# Patient Record
Sex: FEMALE | Race: BLACK OR AFRICAN AMERICAN | NOT HISPANIC OR LATINO | Employment: UNEMPLOYED | URBAN - METROPOLITAN AREA
[De-identification: names, ages, dates, MRNs, and addresses within clinical notes are randomized per-mention and may not be internally consistent; named-entity substitution may affect disease eponyms.]

---

## 2023-04-17 ENCOUNTER — APPOINTMENT (OUTPATIENT)
Dept: RADIOLOGY | Facility: MEDICAL CENTER | Age: 43
End: 2023-04-17
Attending: EMERGENCY MEDICINE
Payer: COMMERCIAL

## 2023-04-17 ENCOUNTER — HOSPITAL ENCOUNTER (EMERGENCY)
Facility: MEDICAL CENTER | Age: 43
End: 2023-04-17
Attending: EMERGENCY MEDICINE
Payer: COMMERCIAL

## 2023-04-17 VITALS
SYSTOLIC BLOOD PRESSURE: 121 MMHG | TEMPERATURE: 98.2 F | RESPIRATION RATE: 16 BRPM | WEIGHT: 177.47 LBS | OXYGEN SATURATION: 95 % | HEART RATE: 75 BPM | DIASTOLIC BLOOD PRESSURE: 78 MMHG | BODY MASS INDEX: 26.29 KG/M2 | HEIGHT: 69 IN

## 2023-04-17 DIAGNOSIS — O20.0 THREATENED MISCARRIAGE: ICD-10-CM

## 2023-04-17 LAB
ALBUMIN SERPL BCP-MCNC: 4.5 G/DL (ref 3.2–4.9)
ALBUMIN/GLOB SERPL: 1.5 G/DL
ALP SERPL-CCNC: 61 U/L (ref 30–99)
ALT SERPL-CCNC: 28 U/L (ref 2–50)
ANION GAP SERPL CALC-SCNC: 13 MMOL/L (ref 7–16)
APPEARANCE UR: CLEAR
AST SERPL-CCNC: 23 U/L (ref 12–45)
B-HCG SERPL-ACNC: 146 MIU/ML (ref 0–5)
BACTERIA #/AREA URNS HPF: NEGATIVE /HPF
BASOPHILS # BLD AUTO: 0.5 % (ref 0–1.8)
BASOPHILS # BLD: 0.03 K/UL (ref 0–0.12)
BILIRUB SERPL-MCNC: <0.2 MG/DL (ref 0.1–1.5)
BILIRUB UR QL STRIP.AUTO: NEGATIVE
BUN SERPL-MCNC: 9 MG/DL (ref 8–22)
CALCIUM ALBUM COR SERPL-MCNC: 9.3 MG/DL (ref 8.5–10.5)
CALCIUM SERPL-MCNC: 9.7 MG/DL (ref 8.5–10.5)
CANDIDA DNA VAG QL PROBE+SIG AMP: NEGATIVE
CHLORIDE SERPL-SCNC: 104 MMOL/L (ref 96–112)
CO2 SERPL-SCNC: 22 MMOL/L (ref 20–33)
COLOR UR: YELLOW
CREAT SERPL-MCNC: 0.54 MG/DL (ref 0.5–1.4)
EOSINOPHIL # BLD AUTO: 0.12 K/UL (ref 0–0.51)
EOSINOPHIL NFR BLD: 1.9 % (ref 0–6.9)
EPI CELLS #/AREA URNS HPF: NEGATIVE /HPF
ERYTHROCYTE [DISTWIDTH] IN BLOOD BY AUTOMATED COUNT: 40.7 FL (ref 35.9–50)
G VAGINALIS DNA VAG QL PROBE+SIG AMP: NEGATIVE
GFR SERPLBLD CREATININE-BSD FMLA CKD-EPI: 119 ML/MIN/1.73 M 2
GLOBULIN SER CALC-MCNC: 3.1 G/DL (ref 1.9–3.5)
GLUCOSE SERPL-MCNC: 153 MG/DL (ref 65–99)
GLUCOSE UR STRIP.AUTO-MCNC: NEGATIVE MG/DL
HCT VFR BLD AUTO: 40 % (ref 37–47)
HGB BLD-MCNC: 13.1 G/DL (ref 12–16)
HYALINE CASTS #/AREA URNS LPF: NORMAL /LPF
IMM GRANULOCYTES # BLD AUTO: 0.01 K/UL (ref 0–0.11)
IMM GRANULOCYTES NFR BLD AUTO: 0.2 % (ref 0–0.9)
KETONES UR STRIP.AUTO-MCNC: NEGATIVE MG/DL
LEUKOCYTE ESTERASE UR QL STRIP.AUTO: ABNORMAL
LIPASE SERPL-CCNC: 36 U/L (ref 11–82)
LYMPHOCYTES # BLD AUTO: 3.32 K/UL (ref 1–4.8)
LYMPHOCYTES NFR BLD: 53.5 % (ref 22–41)
MCH RBC QN AUTO: 26.5 PG (ref 27–33)
MCHC RBC AUTO-ENTMCNC: 32.8 G/DL (ref 33.6–35)
MCV RBC AUTO: 81 FL (ref 81.4–97.8)
MICRO URNS: ABNORMAL
MONOCYTES # BLD AUTO: 0.25 K/UL (ref 0–0.85)
MONOCYTES NFR BLD AUTO: 4 % (ref 0–13.4)
NEUTROPHILS # BLD AUTO: 2.47 K/UL (ref 2–7.15)
NEUTROPHILS NFR BLD: 39.9 % (ref 44–72)
NITRITE UR QL STRIP.AUTO: NEGATIVE
NRBC # BLD AUTO: 0 K/UL
NRBC BLD-RTO: 0 /100 WBC
NUMBER OF RH DOSES IND 8505RD: NORMAL
PH UR STRIP.AUTO: 7 [PH] (ref 5–8)
PLATELET # BLD AUTO: 238 K/UL (ref 164–446)
PMV BLD AUTO: 10.6 FL (ref 9–12.9)
POTASSIUM SERPL-SCNC: 4.5 MMOL/L (ref 3.6–5.5)
PROT SERPL-MCNC: 7.6 G/DL (ref 6–8.2)
PROT UR QL STRIP: NEGATIVE MG/DL
RBC # BLD AUTO: 4.94 M/UL (ref 4.2–5.4)
RBC # URNS HPF: NORMAL /HPF
RBC UR QL AUTO: NEGATIVE
RH BLD: NORMAL
SODIUM SERPL-SCNC: 139 MMOL/L (ref 135–145)
SP GR UR STRIP.AUTO: 1.02
T VAGINALIS DNA VAG QL PROBE+SIG AMP: NEGATIVE
UROBILINOGEN UR STRIP.AUTO-MCNC: 0.2 MG/DL
WBC # BLD AUTO: 6.2 K/UL (ref 4.8–10.8)
WBC #/AREA URNS HPF: NORMAL /HPF

## 2023-04-17 PROCEDURE — 83690 ASSAY OF LIPASE: CPT

## 2023-04-17 PROCEDURE — 86901 BLOOD TYPING SEROLOGIC RH(D): CPT

## 2023-04-17 PROCEDURE — 87660 TRICHOMONAS VAGIN DIR PROBE: CPT

## 2023-04-17 PROCEDURE — 36415 COLL VENOUS BLD VENIPUNCTURE: CPT

## 2023-04-17 PROCEDURE — 87591 N.GONORRHOEAE DNA AMP PROB: CPT

## 2023-04-17 PROCEDURE — 80053 COMPREHEN METABOLIC PANEL: CPT

## 2023-04-17 PROCEDURE — 85025 COMPLETE CBC W/AUTO DIFF WBC: CPT

## 2023-04-17 PROCEDURE — 81001 URINALYSIS AUTO W/SCOPE: CPT

## 2023-04-17 PROCEDURE — 87480 CANDIDA DNA DIR PROBE: CPT

## 2023-04-17 PROCEDURE — 99284 EMERGENCY DEPT VISIT MOD MDM: CPT

## 2023-04-17 PROCEDURE — 84702 CHORIONIC GONADOTROPIN TEST: CPT

## 2023-04-17 PROCEDURE — 87510 GARDNER VAG DNA DIR PROBE: CPT

## 2023-04-17 PROCEDURE — 76801 OB US < 14 WKS SINGLE FETUS: CPT

## 2023-04-17 PROCEDURE — 87491 CHLMYD TRACH DNA AMP PROBE: CPT

## 2023-04-18 LAB
C TRACH DNA GENITAL QL NAA+PROBE: NEGATIVE
N GONORRHOEA DNA GENITAL QL NAA+PROBE: NEGATIVE
SPECIMEN SOURCE: NORMAL

## 2023-04-18 NOTE — ED PROVIDER NOTES
ER Provider Note    Scribed for Cara Newberry M.d. by Desmond Bryson. 4/17/2023  8:18 PM    Primary Care Provider: No primary care provider noted.    CHIEF COMPLAINT  Chief Complaint   Patient presents with    Vaginal Bleeding     Pt states she didn't have a menstrual cycle for the month of March. Pt then had a heavy cycle  this month. Pt went to see her MD and was complaining of lower abdominal pain and pt had a positive pregnancy test at MD's office. Pt sent here for US. Pt unsure of LMP.     EXTERNAL RECORDS REVIEWED  Other None    HPI/ROS  LIMITATION TO HISTORY   Select: Language Bahraini Creole,  Used   OUTSIDE HISTORIAN(S):  None    Genese Saint Paul Noel is a 42 y.o. female who presents to the ED complaining of vaginal bleeding onset earlier this month. She states that she did not have a menstural cycle for the month of March, but she has now been experiecing a heavy cycle this month.  However, bleeding started tapering off a few days ago, and today she is not bleeding.  She presented to [facility] today for the same complaint, and had a positive pregnancy test. She was sent here for an ultrasound. Currently, she denies any pain or vaginal bleeding. She last had some vaginal spotting/bleeding yesterday. She is unsure of the exact date of her last menstrual period but believes her last normal period was in February.  She describes passing some clots with her bleeding last week.  This is her second pregnancy.  History is obtained via iPad .  Patient speaks Creole    PAST MEDICAL HISTORY  Past Medical History:   Diagnosis Date    Diabetes (HCC)        SURGICAL HISTORY  History reviewed. No pertinent surgical history.    FAMILY HISTORY  History reviewed. No pertinent family history.    SOCIAL HISTORY   reports that she has never smoked. She has never used smokeless tobacco. She reports that she does not currently use alcohol. She reports that she does not currently use  "drugs.    CURRENT MEDICATIONS  Discharge Medication List as of 4/17/2023 10:00 PM        CONTINUE these medications which have NOT CHANGED    Details   metFORMIN (GLUCOPHAGE) 500 MG Tab Take 500 mg by mouth 2 times a day with meals., Historical Med             ALLERGIES  Patient has no known allergies.    PHYSICAL EXAM  /87   Pulse 88   Temp 36.1 °C (97 °F) (Temporal)   Resp 16   Ht 1.753 m (5' 9\")   Wt 80.5 kg (177 lb 7.5 oz)   SpO2 100%   BMI 26.21 kg/m²   Constitutional: Well developed, well nourished; No acute distress   HENT: Normocephalic, Atraumatic, Bilateral external ears normal, oropharyngeal examination deferred due to COVID-19 outbreak and lack of oropharyngeal complaint  Eyes: PERRL, EOMI, Conjunctiva normal, No discharge.   Neck: Normal range of motion, supple, nontender  Lymphatic: No lymphadenopathy noted.   Cardiovascular: Normal heart rate, Normal rhythm, No murmurs, rubs or gallops   Thorax & Lungs: CTA=bilaterally;  No respiratory distress, No wheezing rales, or rhonchi; No chest tenderness. No crepitus or subQ air  Abdomen: Nontender, no guarding no rebound, no masses, no pulsatile mass, no tenderness, no distention  Pelvic: External genitalia normal without lesions or vesicles.  There is no blood in the vaginal vault.  No discharge in the vault.  No cervical motion tenderness.  No midline suprapubic tenderness.  No adnexal tenderness or masses.  Skin: Warm, Dry, No erythema, No rash.   Back: No tenderness, No CVA tenderness.   Extremities: 2+ dp and pt pulses bilateral LEs;  Nontender; no pretibial edema  Neurologic: Alert & oriented x 4, clear speech  Psychiatric: appropriate, normal affect.    DIAGNOSTIC STUDIES    Labs:   Results for orders placed or performed during the hospital encounter of 04/17/23   CBC WITH DIFFERENTIAL   Result Value Ref Range    WBC 6.2 4.8 - 10.8 K/uL    RBC 4.94 4.20 - 5.40 M/uL    Hemoglobin 13.1 12.0 - 16.0 g/dL    Hematocrit 40.0 37.0 - 47.0 %    MCV " 81.0 (L) 81.4 - 97.8 fL    MCH 26.5 (L) 27.0 - 33.0 pg    MCHC 32.8 (L) 33.6 - 35.0 g/dL    RDW 40.7 35.9 - 50.0 fL    Platelet Count 238 164 - 446 K/uL    MPV 10.6 9.0 - 12.9 fL    Neutrophils-Polys 39.90 (L) 44.00 - 72.00 %    Lymphocytes 53.50 (H) 22.00 - 41.00 %    Monocytes 4.00 0.00 - 13.40 %    Eosinophils 1.90 0.00 - 6.90 %    Basophils 0.50 0.00 - 1.80 %    Immature Granulocytes 0.20 0.00 - 0.90 %    Nucleated RBC 0.00 /100 WBC    Neutrophils (Absolute) 2.47 2.00 - 7.15 K/uL    Lymphs (Absolute) 3.32 1.00 - 4.80 K/uL    Monos (Absolute) 0.25 0.00 - 0.85 K/uL    Eos (Absolute) 0.12 0.00 - 0.51 K/uL    Baso (Absolute) 0.03 0.00 - 0.12 K/uL    Immature Granulocytes (abs) 0.01 0.00 - 0.11 K/uL    NRBC (Absolute) 0.00 K/uL   COMP METABOLIC PANEL   Result Value Ref Range    Sodium 139 135 - 145 mmol/L    Potassium 4.5 3.6 - 5.5 mmol/L    Chloride 104 96 - 112 mmol/L    Co2 22 20 - 33 mmol/L    Anion Gap 13.0 7.0 - 16.0    Glucose 153 (H) 65 - 99 mg/dL    Bun 9 8 - 22 mg/dL    Creatinine 0.54 0.50 - 1.40 mg/dL    Calcium 9.7 8.5 - 10.5 mg/dL    AST(SGOT) 23 12 - 45 U/L    ALT(SGPT) 28 2 - 50 U/L    Alkaline Phosphatase 61 30 - 99 U/L    Total Bilirubin <0.2 0.1 - 1.5 mg/dL    Albumin 4.5 3.2 - 4.9 g/dL    Total Protein 7.6 6.0 - 8.2 g/dL    Globulin 3.1 1.9 - 3.5 g/dL    A-G Ratio 1.5 g/dL   LIPASE   Result Value Ref Range    Lipase 36 11 - 82 U/L   HCG QUANTITATIVE   Result Value Ref Range    Bhcg 146.0 (H) 0.0 - 5.0 mIU/mL   URINALYSIS,CULTURE IF INDICATED    Specimen: Urine   Result Value Ref Range    Color Yellow     Character Clear     Specific Gravity 1.016 <1.035    Ph 7.0 5.0 - 8.0    Glucose Negative Negative mg/dL    Ketones Negative Negative mg/dL    Protein Negative Negative mg/dL    Bilirubin Negative Negative    Urobilinogen, Urine 0.2 Negative    Nitrite Negative Negative    Leukocyte Esterase Trace (A) Negative    Occult Blood Negative Negative    Micro Urine Req Microscopic    URINE MICROSCOPIC  (W/UA)   Result Value Ref Range    WBC 0-2 /hpf    RBC 0-2 /hpf    Bacteria Negative None /hpf    Epithelial Cells Negative /hpf    Hyaline Cast 0-2 /lpf   RH TYPE FOR RHOGAM FROM E.D.   Result Value Ref Range    Emergency Department Rh Typing POS     Number Of Rh Doses Indicated ZERO    Chlamydia/GC, PCR (Genital/Anal swab)    Specimen: Genital   Result Value Ref Range    Source Vaginal    CORRECTED CALCIUM   Result Value Ref Range    Correct Calcium 9.3 8.5 - 10.5 mg/dL   ESTIMATED GFR   Result Value Ref Range    GFR (CKD-EPI) 119 >60 mL/min/1.73 m 2       Radiology:   The attending emergency physician has independently interpreted the diagnostic imaging associated with this visit and am waiting the final reading from the radiologist.     Preliminary interpretation is a follows: ER MD is reviewed ultrasound.  No obvious IUP seen.    Radiologist interpretation:   US-OB 1ST TRIMESTER WITH TRANSVAGINAL (COMBO)   Final Result      1.  No evidence of intrauterine pregnancy.   2.  A possible subserosal or pedunculated fibroid at the uterine fundus on the right.           COURSE & MEDICAL DECISION MAKING     ED Observation Status? Yes; I am placing the patient in to an observation status due to a diagnostic uncertainty as well as therapeutic intensity. Patient placed in observation status at 8:35 PM, 4/17/2023.     Observation plan is as follows: Patient will undergo evaluation with labs and imaging. Her condition will be closely monitored for any changes.    Upon Reevaluation, the patient's condition has: Improved; and will be discharged.    Patient discharged from ED Observation status at  (Time) 2140 on April 17, 2023 (Date).     INITIAL ASSESSMENT, COURSE AND PLAN  Care Narrative: \Patient presents to the ER at the request of the [facility] clinic after she was found to have a positive pregnancy test at the clinic today.  She went there to get her blood sugars checked.  However, she mentions she missed  a period in March and then had quite a lot of bleeding, sometimes heavy, in early April.  She currently does not have any bleeding.  In fact, she stopped bleeding yesterday.  She does not have any pelvic pain today.  That also ceased yesterday.  The patient has a quantitative hCG of 146.  There is no IUP visualized on the ultrasound.  No obvious adnexal masses.  No free fluid.  She is Rh+.  No need for RhoGAM.  At this time no obvious evidence of ectopic pregnancy.  It is difficult to know if this quantitative hCG of 146 represents early pregnancy or possibly miscarriage.  Patient said she did pass some clots with her heavy bleeding last week.  She has a benign pelvic and abdominal exam today.  She is hemodynamically stable.  Urine is clear.  No evidence of UTI.  At this time I think she is safe and stable for outpatient management discharge home.  She understands she needs to return to the ER in 48 hours for repeat quantitative hCG and repeat ultrasound.  She is also been told to return to ER immediately for any worsening bleeding, worsening pain, dizziness or lightheadedness, passage of clots or tissue, for any concerns.  Patient understands treatment plan and follow-up.    8:25 PM - Patient seen and examined at bedside. Updated patient on the plan of care, including labs and imaging. She is understandable and agreeable with the plan of care. Ordered for labs and ultrasound to evaluate her symptoms.    9:10 PM - Patient's pregnancy results are positive, however her Beta-HCG levels are very low. Patient reevaluated at bedside after her ultrasound. The patient has no pain or bleeding right now. I updated her on her results. I explained to her that her pregnancy test and ultrasound may indicate a very early pregnancy, or a miscarriage. I instructed her to return to the ED for 48 hours for a recheck of her Beta-HCG. I explained that I would like to perform a pelvic exam, however the patient and her family have to  leave the ED soon. I advised her to stay for her pelvic exam and to follow up with the referred gynecologist. They are agreeable to this plan.    ADDITIONAL PROBLEM LIST  Problem #1: Vaginal bleeding (now resolved.)  Problem #2: Abdominal pain (now resolved)  Problem #3: Pregnancy    DISPOSITION AND DISCUSSIONS  I have discussed management of the patient with the following physicians and JACQUELINE's:  none    Discussion of management with other Q or appropriate source(s): None     Escalation of care considered, and ultimately not performed: acute inpatient care management, however at this time, the patient is most appropriate for outpatient management.  At this time there is no evidence of ectopic pregnancy.  Patient has no pain.  No vaginal bleeding.  No free fluid in the pelvis.  She is hemodynamically stable.  She does not need to go to the operating room and does not need to be admitted.    Barriers to care at this time, including but not limited to:  Patient does not have a gynecologist .     Decision tools and prescription drugs considered including, but not limited to: Pain Medications patient denies any abdominal pain.  For this reason no pain medications administered. .    FINAL DIANGOSIS  1. Threatened miscarriage Acute         The note accurately reflects work and decisions made by me.  Cara Newberry M.D.  4/17/2023  10:44 PM     Desmond SEPULVEDA (Johne), am scribing for, and in the presence of, Cara Newberry M.D..    Electronically signed by: Desmond Bryson (Mauricio), 4/17/2023    Cara SEPULVEDA M.D. personally performed the services described in this documentation, as scribed by Desmond Bryson in my presence, and it is both accurate and complete.

## 2023-04-18 NOTE — ED TRIAGE NOTES
"Chief Complaint   Patient presents with    Vaginal Bleeding     Pt states she didn't have a menstrual cycle for the month of March. Pt then had a heavy cycle  this month. Pt went to see her MD and was complaining of lower abdominal pain and pt had a positive pregnancy test at MD's office. Pt sent here for US. Pt unsure of LMP.       Triage obtained using creole .    /87   Pulse 88   Temp 36.1 °C (97 °F) (Temporal)   Resp 16   Ht 1.753 m (5' 9\")   Wt 80.5 kg (177 lb 7.5 oz)   SpO2 100%   BMI 26.21 kg/m²     "

## 2023-04-18 NOTE — DISCHARGE INSTRUCTIONS
Return to the ER in 48 hours for a repeat quantitative pregnancy test.  This repeat pregnancy test will help determine whether or not this is an early pregnancy versus a miscarriage.    Return to the ER immediately for any recurrent abdominal pain/pelvic pain, recurrent vaginal bleeding, dizziness or lightheadedness, fevers over 100.4, shaking chills, nausea, vomiting, or for any concerns.    Pelvic rest until you are seen by OB/GYN.    Follow-up with Dr. Chang, gynecologist, this week.  Please call tomorrow to schedule an appointment.

## 2023-04-18 NOTE — ED NOTES
Patient provided with discharge instructions. Education complete, all questions answered.     vitals stable. All personal belongings accounted for.   Patient ambulated out of the ER with family.

## 2023-04-24 ENCOUNTER — HOSPITAL ENCOUNTER (EMERGENCY)
Facility: MEDICAL CENTER | Age: 43
End: 2023-04-24
Attending: EMERGENCY MEDICINE
Payer: COMMERCIAL

## 2023-04-24 VITALS
HEART RATE: 87 BPM | SYSTOLIC BLOOD PRESSURE: 125 MMHG | DIASTOLIC BLOOD PRESSURE: 72 MMHG | HEIGHT: 69 IN | TEMPERATURE: 97.5 F | BODY MASS INDEX: 26.22 KG/M2 | OXYGEN SATURATION: 96 % | WEIGHT: 177 LBS | RESPIRATION RATE: 16 BRPM

## 2023-04-24 DIAGNOSIS — O03.9 MISCARRIAGE: ICD-10-CM

## 2023-04-24 LAB — B-HCG SERPL-ACNC: 11.8 MIU/ML (ref 0–5)

## 2023-04-24 PROCEDURE — 99284 EMERGENCY DEPT VISIT MOD MDM: CPT

## 2023-04-24 PROCEDURE — 36415 COLL VENOUS BLD VENIPUNCTURE: CPT

## 2023-04-24 PROCEDURE — 84702 CHORIONIC GONADOTROPIN TEST: CPT

## 2023-04-24 ASSESSMENT — FIBROSIS 4 INDEX: FIB4 SCORE: 0.73

## 2023-04-24 NOTE — ED TRIAGE NOTES
"Chief Complaint   Patient presents with    Other     Needs HCG level rechecked     Pt was seen here 1 week ago for vaginal bleeding, told to return to have HCG levels rechecked. Vaginal bleeding has since stopped.     Pt is alert and oriented, speaking in full sentences, follows commands and responds appropriately to questions.      Pt placed in lobby. Pt educated on triage process and apologized for wait time. Pt encouraged to alert staff for any changes.     Patient and staff wearing appropriate PPE      /82   Pulse 98   Temp 36.6 °C (97.9 °F) (Temporal)   Resp 16   Ht 1.753 m (5' 9\")   Wt 80.3 kg (177 lb)   SpO2 97%       "

## 2023-04-24 NOTE — ED NOTES
Discharge instructions discussed with pt, verbalizes understanding. All belongings with pt when leaving unit. Pt amb to lobby with steady gait.

## 2023-04-24 NOTE — ED PROVIDER NOTES
"ED Provider Note    CHIEF COMPLAINT  Chief Complaint   Patient presents with    Other     Needs HCG level rechecked       EXTERNAL RECORDS REVIEWED  ED encounter last week with vaginal bleeding and low level hCG.    hCG level 146 on 4/17.  Rh+    HPI/ROS  LIMITATION TO HISTORY   Select: Language Malagasy Creole,  Used   OUTSIDE HISTORIAN(S):   reports they did not have the ability to return in 2 days and therefore they presented today    Genese Saint Paul Noel is a 42 y.o. female who presents for recheck pregnancy level.  Patient was seen in the emergency department 4/17.  Low level hCG with no IUP in the uterus.  She denies any symptoms.  No pain or severe bleeding.  No dizziness lightheadedness weakness.  Has not followed up with OB/GYN.    PAST MEDICAL HISTORY   has a past medical history of Diabetes (HCC).    SURGICAL HISTORY  patient denies any surgical history    FAMILY HISTORY  No family history on file.    SOCIAL HISTORY  Social History     Tobacco Use    Smoking status: Never    Smokeless tobacco: Never   Vaping Use    Vaping Use: Never used   Substance and Sexual Activity    Alcohol use: Not Currently    Drug use: Not Currently    Sexual activity: Not on file       CURRENT MEDICATIONS  Home Medications       Reviewed by Kiki Bateman R.N. (Registered Nurse) on 04/24/23 at 1143  Med List Status: Not Addressed     Medication Last Dose Status   metFORMIN (GLUCOPHAGE) 500 MG Tab  Active                    ALLERGIES  No Known Allergies    PHYSICAL EXAM  VITAL SIGNS: /72   Pulse 87   Temp 36.4 °C (97.5 °F) (Temporal)   Resp 16   Ht 1.753 m (5' 9\")   Wt 80.3 kg (177 lb)   SpO2 96%   BMI 26.14 kg/m²    Constitutional: Awake and alert  HENT: Normal inspection  Eyes: Normal inspection  Neck: Grossly normal range of motion.  Cardiovascular: Normal heart rate  Thorax & Lungs: No respiratory distress  Extremities: Well perfused  Neurologic: Grossly normal   Psychiatric: Normal for " situation      LABS  Results for orders placed or performed during the hospital encounter of 04/24/23   HCG QUANTITATIVE   Result Value Ref Range    Bhcg 11.8 (H) 0.0 - 5.0 mIU/mL          COURSE & MEDICAL DECISION MAKING      INITIAL ASSESSMENT, COURSE AND PLAN  Care Narrative: Presents to the ER for recheck hCG level.  She does not have pain or heavy bleeding.  Vital signs are normal.  She requires emergency testing to rule out life-threatening conditions such as ectopic pregnancy.  I ordered hCG.    hCG level returned barely positive at 11.8.  I suspect this represents miscarriage.  I cannot completely rule out ectopic although low probability.  She will be referred to [facility].  I have asked her to call today to make an appointment.  I placed a referral in [employer].  Patient is to return to the ER for severe heavy bleeding, pain, fevers or concern    DISPOSITION AND DISCUSSIONS    Escalation of care considered, and ultimately not performed: Consider ultrasound, but hCG level has declined efficiently that I do not believe that this would be helpful    Barriers to care at this time, including but not limited to: Patient lacks transportation .     Decision tools and prescription drugs considered including, but not limited to: No evidence of infection no indication for antibiotic.  No indication for analgesic prescription.    FINAL DIAGNOSIS  First trimester vaginal bleeding, suspected miscarriage       Electronically signed by: Milton Haider M.D., 4/24/2023 1:01 PM

## 2023-04-25 ENCOUNTER — TELEPHONE (OUTPATIENT)
Dept: OBGYN | Facility: CLINIC | Age: 43
End: 2023-04-25

## 2023-04-25 DIAGNOSIS — O36.80X0 PREGNANCY OF UNKNOWN ANATOMIC LOCATION: ICD-10-CM

## 2023-04-26 ENCOUNTER — HOSPITAL ENCOUNTER (OUTPATIENT)
Dept: LAB | Facility: MEDICAL CENTER | Age: 43
End: 2023-04-26
Attending: FAMILY MEDICINE
Payer: COMMERCIAL

## 2023-04-26 ENCOUNTER — HOSPITAL ENCOUNTER (OUTPATIENT)
Dept: LAB | Facility: MEDICAL CENTER | Age: 43
End: 2023-04-26
Attending: OBSTETRICS & GYNECOLOGY
Payer: COMMERCIAL

## 2023-04-26 DIAGNOSIS — O36.80X0 PREGNANCY OF UNKNOWN ANATOMIC LOCATION: ICD-10-CM

## 2023-04-26 LAB
ALBUMIN SERPL BCP-MCNC: 4.6 G/DL (ref 3.2–4.9)
ALBUMIN/GLOB SERPL: 1.6 G/DL
ALP SERPL-CCNC: 64 U/L (ref 30–99)
ALT SERPL-CCNC: 33 U/L (ref 2–50)
ANION GAP SERPL CALC-SCNC: 12 MMOL/L (ref 7–16)
AST SERPL-CCNC: 27 U/L (ref 12–45)
BILIRUB SERPL-MCNC: 0.2 MG/DL (ref 0.1–1.5)
BUN SERPL-MCNC: 14 MG/DL (ref 8–22)
CALCIUM ALBUM COR SERPL-MCNC: 9.7 MG/DL (ref 8.5–10.5)
CALCIUM SERPL-MCNC: 10.2 MG/DL (ref 8.5–10.5)
CHLORIDE SERPL-SCNC: 102 MMOL/L (ref 96–112)
CHOLEST SERPL-MCNC: 168 MG/DL (ref 100–199)
CO2 SERPL-SCNC: 24 MMOL/L (ref 20–33)
CREAT SERPL-MCNC: 0.62 MG/DL (ref 0.5–1.4)
GFR SERPLBLD CREATININE-BSD FMLA CKD-EPI: 115 ML/MIN/1.73 M 2
GLOBULIN SER CALC-MCNC: 2.9 G/DL (ref 1.9–3.5)
GLUCOSE SERPL-MCNC: 149 MG/DL (ref 65–99)
HDLC SERPL-MCNC: 53 MG/DL
LDLC SERPL CALC-MCNC: 91 MG/DL
POTASSIUM SERPL-SCNC: 4.1 MMOL/L (ref 3.6–5.5)
PROT SERPL-MCNC: 7.5 G/DL (ref 6–8.2)
SODIUM SERPL-SCNC: 138 MMOL/L (ref 135–145)
TRIGL SERPL-MCNC: 122 MG/DL (ref 0–149)

## 2023-04-26 PROCEDURE — 80061 LIPID PANEL: CPT

## 2023-04-26 PROCEDURE — 86780 TREPONEMA PALLIDUM: CPT

## 2023-04-26 PROCEDURE — 84702 CHORIONIC GONADOTROPIN TEST: CPT

## 2023-04-26 PROCEDURE — 84443 ASSAY THYROID STIM HORMONE: CPT

## 2023-04-26 PROCEDURE — 80053 COMPREHEN METABOLIC PANEL: CPT

## 2023-04-26 PROCEDURE — 36415 COLL VENOUS BLD VENIPUNCTURE: CPT

## 2023-04-26 PROCEDURE — 86803 HEPATITIS C AB TEST: CPT

## 2023-04-27 LAB
B-HCG SERPL-ACNC: 6.7 MIU/ML (ref 0–5)
HCV AB SER QL: NORMAL
T PALLIDUM AB SER QL IA: NORMAL
TSH SERPL DL<=0.005 MIU/L-ACNC: 0.75 UIU/ML (ref 0.38–5.33)